# Patient Record
(demographics unavailable — no encounter records)

---

## 2024-12-11 NOTE — ADDENDUM
[FreeTextEntry1] :  I, John Paul Ferguson, acted solely as a scribe for Dr. Josue on this date on 12/11/2024.

## 2024-12-11 NOTE — END OF VISIT
[FreeTextEntry3] : This note was written by John Paul Ferguson on 12/11/2024 acting solely as a scribe for Dr. Twin Josue.   All medical record entries made by the Scribe were at my, Dr. Twin Josue, direction and personally dictated by me on 12/11/2024. I have personally reviewed the chart and agree that the record accurately reflects my personal performance of the history, physical exam, assessment and plan.

## 2024-12-11 NOTE — HISTORY OF PRESENT ILLNESS
[Right] : right hand dominant [FreeTextEntry1] : He comes in today for evaluation of right middle finger triggering. He rates his pain as a 6/10 and has locking, weakness and swelling.

## 2024-12-11 NOTE — PROCEDURE
[FreeTextEntry1] : -  After a discussion of risks and benefits, the patient agreed to proceed with a cortisone injection.  -  Side: Right -  Finger: middle finger trigger finger -  Medications: 0.5 cc of 1% Lidocaine and 1 cc of Celestone Soluspan, 6mg/cc, using sterile technique. -  Patient tolerated the procedure well, without complications. -  Patient was told that the symptoms may worsen for a day or two, and should then begin to improve. -  Instructions: Patient was instructed on activity modification for the next several days. -  Follow-up: Within 4 weeks to assess response to the injection.

## 2024-12-11 NOTE — DISCUSSION/SUMMARY
[FreeTextEntry1] : He has findings consistent with a right middle finger trigger finger.    I had a discussion with the patient regarding today's visit, the prognosis of this diagnosis, and treatment recommendations and options. At this time, I recommended a cortisone injection, which he agreed to proceed with at the right middle finger trigger finger.    The patient has agreed to the above plan of management and has expressed full understanding. All questions were fully answered to the patient's satisfaction.   My cumulative time spent on this visit included: Preparation for the visit, review of the medical records, review of pertinent diagnostic studies, examination and counseling of the patient on the above diagnosis, treatment plan and prognosis, orders of diagnostic tests, medication and/or appropriate procedures and documentation in the medical records of today's visit.

## 2024-12-11 NOTE — PHYSICAL EXAM
[de-identified] : - Constitutional: This is a male in no obvious distress.  - Psych: Patient is alert and oriented to person, place and time.  Patient has a normal mood and affect. - Cardiovascular: Normal pulses throughout the upper extremities.  No significant varicosities are noted in the upper extremities. - Neuro: Strength and sensation are intact throughout the upper extremities.  Patient has normal coordination. - Respiratory:  Patient exhibits no evidence of shortness of breath or difficulty breathing. - Skin: No rashes, lesions, or other abnormalities are noted in the upper extremities. --- Examination of his right hand demonstrates swelling and tenderness along the A1 pulley of the middle finger.  There are triggering.  There is no trigger of the other digits.  She is neurovascularly intact distally. [de-identified] :  PA, lateral, and oblique radiographs of the right wrist demonstrate